# Patient Record
Sex: MALE | Race: WHITE | NOT HISPANIC OR LATINO | Employment: FULL TIME | URBAN - METROPOLITAN AREA
[De-identification: names, ages, dates, MRNs, and addresses within clinical notes are randomized per-mention and may not be internally consistent; named-entity substitution may affect disease eponyms.]

---

## 2020-07-20 ENCOUNTER — HOSPITAL ENCOUNTER (OUTPATIENT)
Dept: GENERAL RADIOLOGY | Facility: CLINIC | Age: 60
End: 2020-07-20
Attending: INTERNAL MEDICINE
Payer: COMMERCIAL

## 2020-07-20 ENCOUNTER — OFFICE VISIT (OUTPATIENT)
Dept: FAMILY MEDICINE | Facility: CLINIC | Age: 60
End: 2020-07-20
Payer: COMMERCIAL

## 2020-07-20 VITALS
TEMPERATURE: 96.3 F | SYSTOLIC BLOOD PRESSURE: 116 MMHG | BODY MASS INDEX: 33.65 KG/M2 | OXYGEN SATURATION: 96 % | HEART RATE: 80 BPM | HEIGHT: 68 IN | WEIGHT: 222 LBS | DIASTOLIC BLOOD PRESSURE: 68 MMHG | RESPIRATION RATE: 18 BRPM

## 2020-07-20 DIAGNOSIS — M54.12 CERVICAL RADICULOPATHY: ICD-10-CM

## 2020-07-20 DIAGNOSIS — K42.9 UMBILICAL HERNIA WITHOUT OBSTRUCTION AND WITHOUT GANGRENE: ICD-10-CM

## 2020-07-20 DIAGNOSIS — Z12.5 SCREENING FOR PROSTATE CANCER: ICD-10-CM

## 2020-07-20 DIAGNOSIS — E78.5 HYPERLIPIDEMIA LDL GOAL <130: ICD-10-CM

## 2020-07-20 DIAGNOSIS — I49.9 CARDIAC ARRHYTHMIA, UNSPECIFIED CARDIAC ARRHYTHMIA TYPE: Primary | ICD-10-CM

## 2020-07-20 LAB
ALBUMIN SERPL-MCNC: 4.1 G/DL (ref 3.4–5)
ALP SERPL-CCNC: 69 U/L (ref 40–150)
ALT SERPL W P-5'-P-CCNC: 45 U/L (ref 0–70)
ANION GAP SERPL CALCULATED.3IONS-SCNC: 3 MMOL/L (ref 3–14)
AST SERPL W P-5'-P-CCNC: 25 U/L (ref 0–45)
BASOPHILS # BLD AUTO: 0.1 10E9/L (ref 0–0.2)
BASOPHILS NFR BLD AUTO: 1 %
BILIRUB SERPL-MCNC: 0.4 MG/DL (ref 0.2–1.3)
BUN SERPL-MCNC: 14 MG/DL (ref 7–30)
CALCIUM SERPL-MCNC: 8.8 MG/DL (ref 8.5–10.1)
CHLORIDE SERPL-SCNC: 109 MMOL/L (ref 94–109)
CHOLEST SERPL-MCNC: 168 MG/DL
CO2 SERPL-SCNC: 27 MMOL/L (ref 20–32)
CREAT SERPL-MCNC: 0.82 MG/DL (ref 0.66–1.25)
DIFFERENTIAL METHOD BLD: ABNORMAL
EOSINOPHIL NFR BLD AUTO: 2.1 %
ERYTHROCYTE [DISTWIDTH] IN BLOOD BY AUTOMATED COUNT: 14 % (ref 10–15)
GFR SERPL CREATININE-BSD FRML MDRD: >90 ML/MIN/{1.73_M2}
GLUCOSE SERPL-MCNC: 112 MG/DL (ref 70–99)
HCT VFR BLD AUTO: 46.9 % (ref 40–53)
HDLC SERPL-MCNC: 31 MG/DL
HGB BLD-MCNC: 15.2 G/DL (ref 13.3–17.7)
IMM GRANULOCYTES # BLD: 0.2 10E9/L (ref 0–0.4)
IMM GRANULOCYTES NFR BLD: 2.2 %
LDLC SERPL CALC-MCNC: 73 MG/DL
LYMPHOCYTES # BLD AUTO: 1.9 10E9/L (ref 0.8–5.3)
LYMPHOCYTES NFR BLD AUTO: 22.1 %
MCH RBC QN AUTO: 27.7 PG (ref 26.5–33)
MCHC RBC AUTO-ENTMCNC: 32.4 G/DL (ref 31.5–36.5)
MCV RBC AUTO: 85 FL (ref 78–100)
MONOCYTES # BLD AUTO: 0.5 10E9/L (ref 0–1.3)
MONOCYTES NFR BLD AUTO: 5.8 %
NEUTROPHILS # BLD AUTO: 5.8 10E9/L (ref 1.6–8.3)
NEUTROPHILS NFR BLD AUTO: 66.8 %
NONHDLC SERPL-MCNC: 137 MG/DL
NRBC # BLD AUTO: 0 10*3/UL
NRBC BLD AUTO-RTO: 0 /100
PLATELET # BLD AUTO: 779 10E9/L (ref 150–450)
POTASSIUM SERPL-SCNC: 4.1 MMOL/L (ref 3.4–5.3)
PROT SERPL-MCNC: 7.2 G/DL (ref 6.8–8.8)
PSA SERPL-ACNC: 0.75 UG/L (ref 0–4)
RBC # BLD AUTO: 5.49 10E12/L (ref 4.4–5.9)
SODIUM SERPL-SCNC: 139 MMOL/L (ref 133–144)
TRIGL SERPL-MCNC: 321 MG/DL
WBC # BLD AUTO: 8.7 10E9/L (ref 4–11)

## 2020-07-20 PROCEDURE — 36415 COLL VENOUS BLD VENIPUNCTURE: CPT | Performed by: INTERNAL MEDICINE

## 2020-07-20 PROCEDURE — 93000 ELECTROCARDIOGRAM COMPLETE: CPT | Performed by: INTERNAL MEDICINE

## 2020-07-20 PROCEDURE — G0103 PSA SCREENING: HCPCS | Performed by: INTERNAL MEDICINE

## 2020-07-20 PROCEDURE — 85025 COMPLETE CBC W/AUTO DIFF WBC: CPT | Performed by: INTERNAL MEDICINE

## 2020-07-20 PROCEDURE — 99204 OFFICE O/P NEW MOD 45 MIN: CPT | Performed by: INTERNAL MEDICINE

## 2020-07-20 PROCEDURE — 80053 COMPREHEN METABOLIC PANEL: CPT | Performed by: INTERNAL MEDICINE

## 2020-07-20 PROCEDURE — 72040 X-RAY EXAM NECK SPINE 2-3 VW: CPT | Mod: TC

## 2020-07-20 PROCEDURE — 80061 LIPID PANEL: CPT | Performed by: INTERNAL MEDICINE

## 2020-07-20 ASSESSMENT — PAIN SCALES - GENERAL: PAINLEVEL: NO PAIN (0)

## 2020-07-20 ASSESSMENT — MIFFLIN-ST. JEOR: SCORE: 1783.55

## 2020-07-20 NOTE — LETTER
July 22, 2020      Radames Figueroa  [address] [place] [id number]        Dear Radames, your recent test results are attached.   Severe degenerative disc disease is noted in the neck.   You will be contacted with any outstanding results as they are available.   Feel free to contact me via the office or My Chart if you have any questions regarding the above    Resulted Orders   XR Cervical Spine 2/3 Views    Narrative    CERVICAL SPINE TWO TO THREE VIEWS   7/20/2020 10:05 AM     HISTORY: Cervical radiculopathy.    COMPARISON: None.      Impression    IMPRESSION: Moderate-to-severe degenerative disc disease is present at  C5-6. Alignment is significant for slight reversal of the normal  cervical lordosis and trace grade 1 anterolisthesis of C4 on C5.  Multilevel mild facet arthropathy is present. Small benign-appearing  ovoid calcification noted along the right cervical spine.    JOHN RODRIGUEZ MD       If you have any questions or concerns, please call the clinic at the number listed above.       Sincerely,        Delgado Wright DO

## 2020-07-20 NOTE — PROGRESS NOTES
Subjective     Radames Figueroa is a 60 year old male who presents to clinic today for the following health issues:    HPI       Chief Complaint   Patient presents with     Hernia     about 2 years, mid abdomen      Musculoskeletal Problem     left hand numbness into thing, has had neck pain an swelling as well                       Chief Complaint         The patient is a 60-year-old gentleman who presents today with 2 primary concerns.  He is concerned that he has a hernia in his mid abdomen.  He describes an umbilical hernia and notes that it is not painful or associated with any gastrointestinal symptoms but he is just curious as to whether or not it is a problem.  His other concern is that his left hand goes numb frequently.  He notes that it starts in the thumb and index finger but does radiate to all fingers eventually.  It goes up his arm past the elbow and into his shoulder.  He can make this worse with neck motion.  Phalen's and Tinel's tests did not elicit symptoms.  He notes no recent neck or trauma.  Is take some over-the-counter anti-inflammatories for this and they have brought about no significant improvement.  He takes no medication on a daily basis.                       PAST, FAMILY,SOCIAL HISTORY:     Medical  History:   has a past medical history of History of open heart surgery (age 9).     Surgical History:   has a past surgical history that includes vasectomy (11/25/92) and ANESTH,OPEN HEART SURGERY+PUMP (age 9).     Social History:   reports that he has never smoked. He has never used smokeless tobacco. He reports current alcohol use. He reports that he does not use drugs.     Family History:  family history includes C.A.D. (age of onset: 70) in his brother; C.A.D. (age of onset: 72) in his brother; C.A.D. (age of onset: 73) in his father; Cancer in his mother.            MEDICATIONS  No current outpatient medications on file.  "        --------------------------------------------------------------------------------------------------------------------                              Review of Systems       LUNGS: Pt denies: cough, excess sputum, hemoptysis, or shortness of breath.   HEART: Pt denies: chest pain, arrhythmia, syncope, tachy or bradyarrhythmia.   GI: Pt denies: nausea, vomiting, diarrhea, constipation, melena, or hematochezia.   NEURO: Pt denies: seizures, strokes, diplopia, weakness, paraesthesias, or paralysis.  Does have the discomfort in the left arm as described.   SKIN: Pt denies: itching, rashes, discoloration, or specific lesions of concern. Denies recent hair loss.   PSYCH: The patient denies significant depression, anxiety, mood imbalance. Specifically denies any suicidal ideation.      Examination       /68 (BP Location: Right arm, Patient Position: Sitting, Cuff Size: Adult Regular)   Pulse 80   Temp 96.3  F (35.7  C) (Temporal)   Resp 18   Ht 1.715 m (5' 7.5\")   Wt 100.7 kg (222 lb)   SpO2 96%   BMI 34.26 kg/m       Constitutional: The patient appears to be in no acute distress. The patient appears to be adequately hydrated. No acute respiratory or hemodynamic distress is noted at this time.   LUNGS: clear bilaterally, airflow is brisk, no intercostal retraction or stridor is noted. No coughing is noted during visit.   HEART: Somewhat irregular without rubs, clicks, gallops, or murmurs. PMI is nondisplaced. Upstrokes are brisk. S1,S2 are heard.   GI: Abdomen is soft, without rebound, guarding or tenderness. Bowel sounds are appropriate. No renal bruits are heard.  Evidence of an easily reducible umbilical hernia present.   NEURO: Pt is alert and appropriate. No neurologic lateralization is noted. Cranial nerves 2-12 are intact. Peripheral sensory and motor function are grossly normal.    MS: Minimal crepitance is noted in the extremities. No deformity is present. Muscle strength is appropriate and equal " bilaterally. No acute joint erythema or swelling is present.  Reproducible hand and shoulder pain is noted with cervical range of motion.       Decision Making       1. Umbilical hernia without obstruction and without gangrene  We will hold off on surgical consultation until his surgical concern is resolved.  The strangulation/incarceration etc.    2. Cervical radiculopathy  We will set up x-ray and MRI cervical spine to evaluate the potential for spinal/foraminal stenosis.  - XR Cervical Spine 2/3 Views; Future  - MR Cervical Spine w/o Contrast; Future    3. Cardiac arrhythmia, unspecified cardiac arrhythmia type  Patient did have some cardiac arrhythmia.  EKG demonstrates a sinus rhythm with right bundle branch block this time.  Will follow for recurrence or symptoms.  - Comprehensive metabolic panel (BMP + Alb, Alk Phos, ALT, AST, Total. Bili, TP)  - CBC with platelets and differential  - EKG 12-lead complete w/read - Clinics    4. Hyperlipidemia LDL goal <130  Check lipid panel and adjust medications accordingly  - Lipid panel reflex to direct LDL Fasting    5. Screening for prostate cancer  Screening performed  - PSA, screen                               FOLLOW UP   I have asked the patient to make an appointment for followup with me following the radiographic work-up            I have carefully explained the diagnosis and treatment options to the patient.  The patient has displayed an understanding of the above, and all subsequent questions were answered.      DO SHERRIE Mccabe    Portions of this note were produced using [employer]  Although every attempt at real-time proof reading has been made, occasional grammar/syntax errors may have been missed.

## 2020-07-20 NOTE — LETTER
July 22, 2020      Radames Figueroa  [address] [place] [phone]        Dear ,    We are writing to inform you of your test results.    The PSA suggest a low risk for prostate cancer.   The chemistry panel is normal with a minimally elevated blood sugar 112 and normal kidney and liver tests.   Cholesterol is well controlled with an LDL of 73.   The blood cell count shows a slight increase in platelets at 779, we should repeat the blood cell count and then a month or so as we do not have previous values for comparison.   You will be contacted with any outstanding results as they are available.   Feel free to contact me via the office or My Chart if you have any questions regarding the above.     Resulted Orders   Comprehensive metabolic panel (BMP + Alb, Alk Phos, ALT, AST, Total. Bili, TP)   Result Value Ref Range    Sodium 139 133 - 144 mmol/L    Potassium 4.1 3.4 - 5.3 mmol/L    Chloride 109 94 - 109 mmol/L    Carbon Dioxide 27 20 - 32 mmol/L    Anion Gap 3 3 - 14 mmol/L    Glucose 112 (H) 70 - 99 mg/dL      Comment:      Non Fasting    Urea Nitrogen 14 7 - 30 mg/dL    Creatinine 0.82 0.66 - 1.25 mg/dL    GFR Estimate >90 >60 mL/min/[1.73_m2]      Comment:      Non  GFR Calc  Starting 12/18/2018, serum creatinine based estimated GFR (eGFR) will be   calculated using the Chronic Kidney Disease Epidemiology Collaboration   (CKD-EPI) equation.      GFR Estimate If Black >90 >60 mL/min/[1.73_m2]      Comment:       GFR Calc  Starting 12/18/2018, serum creatinine based estimated GFR (eGFR) will be   calculated using the Chronic Kidney Disease Epidemiology Collaboration   (CKD-EPI) equation.      Calcium 8.8 8.5 - 10.1 mg/dL    Bilirubin Total 0.4 0.2 - 1.3 mg/dL    Albumin 4.1 3.4 - 5.0 g/dL    Protein Total 7.2 6.8 - 8.8 g/dL    Alkaline Phosphatase 69 40 - 150 U/L    ALT 45 0 - 70 U/L    AST 25 0 - 45 U/L   CBC with platelets and differential   Result Value Ref  Range    WBC 8.7 4.0 - 11.0 10e9/L    RBC Count 5.49 4.4 - 5.9 10e12/L    Hemoglobin 15.2 13.3 - 17.7 g/dL    Hematocrit 46.9 40.0 - 53.0 %    MCV 85 78 - 100 fl    MCH 27.7 26.5 - 33.0 pg    MCHC 32.4 31.5 - 36.5 g/dL    RDW 14.0 10.0 - 15.0 %    Platelet Count 779 (H) 150 - 450 10e9/L    Diff Method Automated Method     % Neutrophils 66.8 %    % Lymphocytes 22.1 %    % Monocytes 5.8 %    % Eosinophils 2.1 %    % Basophils 1.0 %    % Immature Granulocytes 2.2 %    Nucleated RBCs 0 0 /100    Absolute Neutrophil 5.8 1.6 - 8.3 10e9/L    Absolute Lymphocytes 1.9 0.8 - 5.3 10e9/L    Absolute Monocytes 0.5 0.0 - 1.3 10e9/L    Absolute Basophils 0.1 0.0 - 0.2 10e9/L    Abs Immature Granulocytes 0.2 0 - 0.4 10e9/L    Absolute Nucleated RBC 0.0    Lipid panel reflex to direct LDL Fasting   Result Value Ref Range    Cholesterol 168 <200 mg/dL    Triglycerides 321 (H) <150 mg/dL      Comment:      Borderline high:  150-199 mg/dl  High:             200-499 mg/dl  Very high:       >499 mg/dl  Non Fasting      HDL Cholesterol 31 (L) >39 mg/dL    LDL Cholesterol Calculated 73 <100 mg/dL      Comment:      Desirable:       <100 mg/dl    Non HDL Cholesterol 137 (H) <130 mg/dL      Comment:      Above Desirable:  130-159 mg/dl  Borderline high:  160-189 mg/dl  High:             190-219 mg/dl  Very high:       >219 mg/dl     PSA, screen   Result Value Ref Range    PSA 0.75 0 - 4 ug/L      Comment:      Assay Method:  Chemiluminescence using Siemens Vista analyzer       If you have any questions or concerns, please call the clinic at the number listed above.       Sincerely,        Delgado Wright DO

## 2020-07-22 NOTE — RESULT ENCOUNTER NOTE
Dear Radames, your recent test results are attached.  The PSA suggest a low risk for prostate cancer.  The chemistry panel is normal with a minimally elevated blood sugar 112 and normal kidney and liver tests.  Cholesterol is well controlled with an LDL of 73.  The blood cell count shows a slight increase in platelets at 779, we should repeat the blood cell count and then a month or so as we do not have previous values for comparison.  You will be contacted with any outstanding results as they are available.  Feel free to contact me via the office or My Chart if you have any questions regarding the above.

## 2020-07-22 NOTE — RESULT ENCOUNTER NOTE
Dear Radames, your recent test results are attached.  Severe degenerative disc disease is noted in the neck.  You will be contacted with any outstanding results as they are available.  Feel free to contact me via the office or My Chart if you have any questions regarding the above.

## 2020-07-24 ENCOUNTER — HOSPITAL ENCOUNTER (OUTPATIENT)
Dept: MRI IMAGING | Facility: CLINIC | Age: 60
End: 2020-07-24
Attending: INTERNAL MEDICINE
Payer: COMMERCIAL

## 2020-07-24 ENCOUNTER — HOSPITAL ENCOUNTER (OUTPATIENT)
Dept: GENERAL RADIOLOGY | Facility: CLINIC | Age: 60
End: 2020-07-24
Attending: INTERNAL MEDICINE
Payer: COMMERCIAL

## 2020-07-24 DIAGNOSIS — Z09 FOLLOW UP: ICD-10-CM

## 2020-07-24 DIAGNOSIS — M54.12 CERVICAL RADICULOPATHY: ICD-10-CM

## 2020-07-24 PROCEDURE — 71046 X-RAY EXAM CHEST 2 VIEWS: CPT | Mod: TC

## 2020-07-24 PROCEDURE — 72141 MRI NECK SPINE W/O DYE: CPT

## 2020-07-24 NOTE — LETTER
July 28, 2020      Radames Figueroa  [address] [place] [phone]        Dear ,    We are writing to inform you of your test results.    Chest x-ray is normal.   You will be contacted with any outstanding results as they are available.   Feel free to contact me via the office or My Chart if you have any questions regarding the above.     Resulted Orders   XR Chest 2 Views    Narrative    CHEST TWO VIEWS 7/24/2020 6:17 PM     HISTORY: Possible foreign body in the chest prior MRI    COMPARISON: None.       Impression    IMPRESSION: There are no acute infiltrates. The cardiac silhouette is  not enlarged. Pulmonary vasculature is unremarkable.    CHRISSIE ALEXANDRE MD       If you have any questions or concerns, please call the clinic at the number listed above.       Sincerely,        Delgado Wright DO

## 2020-07-28 ENCOUNTER — TELEPHONE (OUTPATIENT)
Dept: FAMILY MEDICINE | Facility: OTHER | Age: 60
End: 2020-07-28

## 2020-07-28 NOTE — TELEPHONE ENCOUNTER
----- Message from Delgado Wright DO sent at 7/28/2020  8:51 AM CDT -----  Please call      Dear Radames, your recent test results are attached.  MRI of the cervical spine demonstrates multilevel degenerative disc disease and arthritis.  There is mild to moderate spinal stenosis noted at 2 locations as well as some foraminal stenosis.      I would recommend neurosurgical evaluation.  Please let me know if you would like me to set this up.      You will be contacted with any outstanding results as they are available.  Feel free to contact me via the office or My Chart if you have any questions regarding the above.

## 2020-07-28 NOTE — RESULT ENCOUNTER NOTE
Please call      Dear Radames, your recent test results are attached.  MRI of the cervical spine demonstrates multilevel degenerative disc disease and arthritis.  There is mild to moderate spinal stenosis noted at 2 locations as well as some foraminal stenosis.      I would recommend neurosurgical evaluation.  Please let me know if you would like me to set this up.      You will be contacted with any outstanding results as they are available.  Feel free to contact me via the office or My Chart if you have any questions regarding the above.

## 2020-07-28 NOTE — RESULT ENCOUNTER NOTE
Dear Radames, your recent test results are attached.  Chest x-ray is normal.  You will be contacted with any outstanding results as they are available.  Feel free to contact me via the office or My Chart if you have any questions regarding the above.

## 2020-07-28 NOTE — TELEPHONE ENCOUNTER
Called and LM for patient to call back. Please relay results below to patient .     Lilibeth Mitchell MA

## 2020-07-30 NOTE — TELEPHONE ENCOUNTER
Edil returns call and message is relayed.  Edil states he would like to take some time to let this information sink in and think about it before he makes a decision.

## 2022-01-28 ENCOUNTER — VIRTUAL VISIT (OUTPATIENT)
Dept: FAMILY MEDICINE | Facility: CLINIC | Age: 62
End: 2022-01-28
Payer: COMMERCIAL

## 2022-01-28 DIAGNOSIS — L03.115 CELLULITIS OF RIGHT LOWER EXTREMITY: Primary | ICD-10-CM

## 2022-01-28 PROCEDURE — 99213 OFFICE O/P EST LOW 20 MIN: CPT | Mod: GT | Performed by: NURSE PRACTITIONER

## 2022-01-28 RX ORDER — DOXYCYCLINE 100 MG/1
100 CAPSULE ORAL 2 TIMES DAILY
Qty: 20 CAPSULE | Refills: 0 | Status: SHIPPED | OUTPATIENT
Start: 2022-01-28 | End: 2022-02-03

## 2022-01-28 NOTE — PROGRESS NOTES
Edil is a 62 year old who is being evaluated via a billable video visit.      How would you like to obtain your AVS? MyChart  If the video visit is dropped, the invitation should be resent by: Text to cell phone: [phone] TEXT PT'S PHONE FOR VISIT  Will anyone else be joining your video visit? No      Video Start Time: 4:27 PM    Assessment & Plan     Cellulitis of right lower extremity  Lesion on video concerning for cellulitis and possible MRSA based on brief visualization. Recommended starting doxycycline to cover infection. Discussed with patient that I would like him seen in person for further evaluation.   - doxycycline hyclate (VIBRAMYCIN) 100 MG capsule; Take 1 capsule (100 mg) by mouth 2 times daily      Return in about 4 days (around 2/1/2022) for re-evaluation of the lesion. .    Jaz Kirby, ROMEO CNP  [facility]    Subjective   Edil is a 62 year old who presents for the following health issues     HPI     Rashpt has some red spots with black center on rt knee that are getting red around them 1-2 weeks one got better and went away but more are showing up  Onset/Duration:   Description  Location: rt knee  Character: raised, painful, red  Itching: no  Intensity:  moderate  Progression of Symptoms:  worsening and constant  Accompanying signs and symptoms:   Fever: no  Body aches or joint pain: no  Sore throat symptoms: no  Recent cold symptoms: no  History:           Previous episodes of similar rash: None  New exposures:  None  Recent travel: no  Exposure to similar rash: no  Precipitating or alleviating factors: none  Therapies tried and outcome: antibiotic ointment    Large lesion on right leg that is above the knee. It is raised and painful. It is about a quarter size. He has been putting thieves on the sore and neosporin.   He has two other lesions that are on his       Review of Systems   Constitutional, HEENT, cardiovascular, pulmonary, gi and gu systems are  negative, except as otherwise noted.      Objective           Vitals:  No vitals were obtained today due to virtual visit.    Physical Exam   GENERAL: Healthy, alert and no distress  EYES: Eyes grossly normal to inspection.  No discharge or erythema, or obvious scleral/conjunctival abnormalities.  RESP: No audible wheeze, cough, or visible cyanosis.  No visible retractions or increased work of breathing.    SKIN: Right distal thigh lesion with eschar/ yellow crusting on top. slight erythremia around the lesion.  Other pinpoint lesions are present on the legs.    NEURO: Cranial nerves grossly intact.  Mentation and speech appropriate for age.  PSYCH: Mentation appears normal, affect normal/bright, judgement and insight intact, normal speech and appearance well-groomed.                Video-Visit Details    Type of service:  Video Visit    Video End Time:4:35 PM    Originating Location (pt. Location): Home    Distant Location (provider location):  [facility]     Platform used for Video Visit: [employer] Dual[employer]

## 2022-02-03 ENCOUNTER — HOSPITAL ENCOUNTER (EMERGENCY)
Facility: CLINIC | Age: 62
Discharge: HOME | End: 2022-02-03
Attending: NURSE PRACTITIONER | Admitting: NURSE PRACTITIONER
Payer: COMMERCIAL

## 2022-02-03 VITALS
HEART RATE: 80 BPM | DIASTOLIC BLOOD PRESSURE: 83 MMHG | OXYGEN SATURATION: 98 % | SYSTOLIC BLOOD PRESSURE: 171 MMHG | BODY MASS INDEX: 29.77 KG/M2 | TEMPERATURE: 97.2 F | HEIGHT: 69 IN | WEIGHT: 201 LBS

## 2022-02-03 DIAGNOSIS — T14.8XXA WOUND INFECTION: ICD-10-CM

## 2022-02-03 DIAGNOSIS — L03.115 CELLULITIS OF RIGHT LOWER EXTREMITY: ICD-10-CM

## 2022-02-03 DIAGNOSIS — L08.9 WOUND INFECTION: ICD-10-CM

## 2022-02-03 PROCEDURE — G0463 HOSPITAL OUTPT CLINIC VISIT: HCPCS | Mod: 25 | Performed by: NURSE PRACTITIONER

## 2022-02-03 PROCEDURE — 10060 I&D ABSCESS SIMPLE/SINGLE: CPT | Performed by: NURSE PRACTITIONER

## 2022-02-03 PROCEDURE — 87077 CULTURE AEROBIC IDENTIFY: CPT | Performed by: NURSE PRACTITIONER

## 2022-02-03 PROCEDURE — 99213 OFFICE O/P EST LOW 20 MIN: CPT | Mod: 25 | Performed by: NURSE PRACTITIONER

## 2022-02-03 PROCEDURE — 87205 SMEAR GRAM STAIN: CPT | Performed by: NURSE PRACTITIONER

## 2022-02-03 RX ORDER — DOXYCYCLINE 100 MG/1
100 CAPSULE ORAL 2 TIMES DAILY
Qty: 20 CAPSULE | Refills: 0 | Status: SHIPPED | OUTPATIENT
Start: 2022-02-03

## 2022-02-03 RX ORDER — CEPHALEXIN 500 MG/1
500 CAPSULE ORAL 4 TIMES DAILY
Qty: 40 CAPSULE | Refills: 0 | Status: SHIPPED | OUTPATIENT
Start: 2022-02-03 | End: 2022-02-06

## 2022-02-03 ASSESSMENT — MIFFLIN-ST. JEOR: SCORE: 1702.11

## 2022-02-04 NOTE — DISCHARGE INSTRUCTIONS
Go home tonight and shower.  Remove the dressing prior to showering.  Pat dry and apply a small amount of bacitracin to the area.  Leave the wound open to air.  Continue the doxycycline twice daily.  Also start Keflex and take this four times daily.  When the antibiotics are finished, if the infection is still present you must be seen.  If at any point one time you develop fever of 101, chills, sweats or the redness or swelling that begins to worsen again please return for reevaluation.  Use the chlorhexidine soap and shower with this for the next 3 days.  The wound culture should result within 48 hours and be available via [employer].

## 2022-02-04 NOTE — ED PROVIDER NOTES
History   No chief complaint on file.    HPI  Radames Figueroa is a 62 year old male who presents to urgent care for follow-up wound check.  Patient seen via virtual visit on January 29 and started on doxycycline for suspected infected wound versus cellulitis.  Patient reports at that time he had severe redness of his right lower leg including swelling.  Patient denies any trauma at that time.  Patient states he had been out of town for work and may have had insect bites but he is uncertain.  Patient states since initiating the doxycycline the swelling has improved by at least 90% and the redness has improved by at least 90%.  Patient states the scabbed area around his knee still has localized swelling and redness.  Patient states he continues to have other small red dots that pop up elsewhere but are not nearly as large and they do not lose pustular drainage like previously.  Patient denies any fever, aches, chills, sweats, history of MRSA, history of similar illness.  Patient states no one else around him has similar symptoms.  Patient denies chest pain, cough, shortness of breath, difficulty breathing, nausea, vomiting, abdominal pain, mental confusion, dizziness or lightheadedness.      Allergies:  No Known Allergies    Problem List:    Patient Active Problem List    Diagnosis Date Noted     CARDIOVASCULAR SCREENING; LDL GOAL LESS THAN 130 10/20/2014     Priority: Medium     Umbilical hernia 10/20/2014     Priority: Medium     Seborrheic keratosis 10/20/2014     Priority: Medium        Past Medical History:    Past Medical History:   Diagnosis Date     History of open heart surgery age 9       Past Surgical History:    Past Surgical History:   Procedure Laterality Date     VASECTOMY  11/25/92     ZZC ANESTH,OPEN HEART SURGERY+PUMP  age 9    valve replacement       Family History:    Family History   Problem Relation Age of Onset     C.A.D. Father 73        [place]     Cancer Mother         multiple myeloma      "C.A.D. Brother 72        MI     C.A.D. Brother 70        MI     Cancer - colorectal No family hx of      Prostate Cancer No family hx of        Social History:  Marital Status:   [2]  Social History     Tobacco Use     Smoking status: Never Smoker     Smokeless tobacco: Never Used   Vaping Use     Vaping Use: Never used   Substance Use Topics     Alcohol use: Yes     Comment: 1-2 per week.     Drug use: No        Medications:    cephALEXin (KEFLEX) 500 MG capsule  doxycycline hyclate (VIBRAMYCIN) 100 MG capsule          Review of Systems  As mentioned above in the history present illness. All other systems were reviewed and are negative.    Physical Exam   BP: (!) 171/83  Pulse: 80  Temp: 97.2  F (36.2  C)  Height: 175.3 cm (5' 9\")  Weight: 91.2 kg (201 lb)  SpO2: 98 %      Physical Exam  Vitals and nursing note reviewed.   Constitutional:       General: He is not in acute distress.     Appearance: He is well-developed. He is obese. He is not ill-appearing, toxic-appearing or diaphoretic.   HENT:      Head: Normocephalic and atraumatic.      Right Ear: External ear normal.      Left Ear: External ear normal.      Nose: Nose normal.   Eyes:      General:         Right eye: No discharge.         Left eye: No discharge.      Conjunctiva/sclera: Conjunctivae normal.   Cardiovascular:      Rate and Rhythm: Normal rate and regular rhythm.      Heart sounds: Normal heart sounds. No murmur heard.  No friction rub. No gallop.    Pulmonary:      Effort: Pulmonary effort is normal.      Breath sounds: Normal breath sounds. No stridor. No wheezing.   Skin:     General: Skin is warm.      Findings: Erythema (4 cm by 6 cm rectangular erythematous macoule with central scab that is weeping purulent drainage) present. No rash.      Comments: The wound was cleansed with Hibiclens and water and the scab was deroofed and cultured.  The wound was dressed with bacitracin and a 4 x 4.     Neurological:      Mental Status: He is " alert and oriented to person, place, and time.                 ED Course                 Procedures    No results found for this or any previous visit (from the past 24 hour(s)).    Medications - No data to display    Assessments & Plan (with Medical Decision Making)     I have reviewed the nursing notes.    I have reviewed the findings, diagnosis, plan and need for follow up with the patient.  Radames Figueroa is a 62 year old male who presents to urgent care for follow-up wound check after being seen via virtual visit and initiated on doxycycline 5 days ago.  Patient reports his symptoms are markedly improved and denies any fever, aches, chills, sweats and low suspicion for sepsis.  Given the purulent drainage noted around the central lesion this area was cultured, cleansed, deroofed and redressed.  For supportive management did place patient on Keflex as well.  Patient is on my chart and can follow his culture results.  Refill doxycycline.  Discussed worrisome reasons to return.  Discussed skin care.  Patient verbalized understanding and discharged in stable condition.    Discharge Medication List as of 2/3/2022  6:34 PM      START taking these medications    Details   cephALEXin (KEFLEX) 500 MG capsule Take 1 capsule (500 mg) by mouth 4 times daily for 10 days, Disp-40 capsule, R-0, E-Prescribe             Final diagnoses:   Cellulitis of right lower extremity   Wound infection       2/3/2022   [facility] EMERGENCY DEPT     Darian, ROMEO Holland CNP  02/03/22 [id number]

## 2022-02-04 NOTE — RESULT ENCOUNTER NOTE
[facility] Emergency Dept discharge antibiotic prescribed: [1] Cephalexin (Keflex) 500 mg capsule, 1 capsule (500 mg) by mouth 4 times daily for 10 days AND [2] Doxycycline (Per ED Provider note, Patient was advised to refill Doxycycline (Rx extended 10 days)   Incision and Drainage performed in [facility] Emergency Dept [Yes or No]: No  Recommendations in treatment per [facility] ED Lab Result culture protocol

## 2022-02-06 ENCOUNTER — TELEPHONE (OUTPATIENT)
Dept: EMERGENCY MEDICINE | Facility: CLINIC | Age: 62
End: 2022-02-06
Payer: COMMERCIAL

## 2022-02-06 DIAGNOSIS — A49.02 MRSA INFECTION: ICD-10-CM

## 2022-02-06 LAB
BACTERIA WND CULT: ABNORMAL
GRAM STAIN RESULT: ABNORMAL
GRAM STAIN RESULT: ABNORMAL

## 2022-02-06 RX ORDER — CLINDAMYCIN HCL 150 MG
450 CAPSULE ORAL 3 TIMES DAILY
Qty: 45 CAPSULE | Refills: 0 | Status: SHIPPED | OUTPATIENT
Start: 2022-02-06 | End: 2022-02-11

## 2022-02-06 NOTE — TELEPHONE ENCOUNTER
RN Recommendations/Instructions per [facility] ED provider  1:20PM: Patient notified of ED provider's treatment recommendations as noted below.  Rx for Clindamycin (Cleocin) 150 mg PO capsule, Take 3 capsules (450 mg) by mouth 3 times daily for 5 days sent to [Pharmacy - [facility] in [facility] in [place]].  RN reviewed information about MRSA from protocol.  Advised to stop Keflex. Continue taking Doxycycline. Add Clindamycin.   Information on MRSA sent to patient via [email or web address].   Advised to follow up with a PCP when his antibiotic is done for a recheck.  The patient is comfortable with the information given and has no further questions.      Please Contact your PCP clinic or return to the Emergency department if your:    Symptoms do not resolve after completing antibiotic.    Symptoms worsen or other concerning symptom's.    PCP follow-up Questions asked: YES       Carina Limon RN  [facility]  Emergency Dept Lab Result RN  Ph# [phone]

## 2022-02-06 NOTE — TELEPHONE ENCOUNTER
"[facility] ([place]) Emergency Department Lab result notification [Adult-Male]     [facility] ED lab result protocol used  Wound Culture     Reason for call  Notify of lab results, assess symptoms,  review ED providers recommendations/discharge instructions (if necessary) and advise per ED lab result f/u protocol     Lab Result (including Rx patient on, if applicable)  Final Wound Aerobic Bacterial Culture (specimen - Knee, Right; wound) report on 2/6/22  [facility] Emergency Dept discharge antibiotic prescribed: Cephalexin (Keflex) 500 mg capsule, 1 capsule (500 mg) by mouth 4 times daily for 10 days AND [2] Doxycycline (Per ED Provider note, Patient was advised to refill Doxycycline (Rx extended 10 days)   #1. Bacteria, 3+ Staphylococcus aureus MRSA,  is [RESISTANT] to antibiotic.  Incision and Drainage performed in the [facility] ED: NO  Recommendations in treatment per [facility] ED lab result culture protocol    RN Assessment (Patient s current Symptoms), include time called.  [Insert Left message here if message left]  12:44PM: Spoke with patient. He states that the antibiotics have been helping some areas of the sores/red spots. Feels like he is improving. The redness is less. The swelling has gone \"way down\". Has new areas of redness (spots) that have appeared on the same leg and one spot on the abdomen.     RN Recommendations/Instructions per [facility] ED lab result protocol  Patient notified of lab result and treatment recommendations.   RN reviewed information about MRSA from protocol patient education and Epic reference.   Advised to wash hands frequently.   Do not share any personal items such as towels, washcloths.  Wash sheets, bedding, towels, etc in hot water with laundry detergent with liquid bleach and use a hot dryer.   Wipe down commonly touched surfaces with a bleach solution.    Advised that I would consult with the ED provider and call him back.     [facility] " Department Provider Name & Recommendations (included time consulted)  1:06PM:  Consulted with [facility] ED provider Dr. Rinku Grace. Reviewed patient's history, current symptoms and culture results. Dr. Grace recommends stopping Keflex, continue Doxycycline. Start Clindamycin 450mg 3 times a day for 5 days.      PCP follow-up Questions asked: YES       Carina Limon RN  [facility]  Emergency Dept Lab Result RN  Ph# [phone]

## 2022-02-06 NOTE — PATIENT INSTRUCTIONS
"  Patient Education   What is Methicillin-resistant Staphylococcus aureus (MRSA)?  Your laboratory test was positive for MRSA.   What is MRSA?   MRSA is a type of staph bacteria that can stop antibiotic medicines from working.  Staphylococcus aureus (staph, for short) bacteria normally live on the skin or in the nose without causing problems. Sometimes, these bacteria learn how to break down the antibiotic, methicillin. This is known as MRSA (methicillin-resistant Staphylococcus aureus. MRSA can happen when people take antibiotics they do not need or when they take them incorrectly.  In many healthy people, MRSA can live in the body without making the person feel sick. When MRSA does make someone feel sick, the sickness could be in any part of the body.  How is it spread?    MRSA can be spread from person-to-person by:  ? Unclean hands  ? Sharing personal items  ? Touching contaminated surfaces or equipment    MRSA is not usually spread through the air by coughing or sneezing.  Even if you don't feel sick, you can pass MRSA to others, and they could become sick.  How is MRSA treated?    You may not need medicine for this. Your care team will decide.    If medicine is needed, we will give you oral or intravenous (IV) antibiotics.  ? When you get sick because of MRSA, the infection can be hard to treat. This is because your care team has fewer antibiotics to choose from.  How does the hospital prevent infections?  Since hospitals have a lot of very sick people in them, they take extra steps to stop MRSA from spreading to other patients. We will put a \"Contact Precautions\" sign on your door. This will remind staff of the extra steps to take when they go into your room:      Handwashing and alcohol-based hand : We will clean our hands before coming into your room, touching you or leaving your room.      Gown and gloves: We will wear a gown and gloves when we care for you.      Cleaning: We will clean " stethoscopes and other instruments before and after each use.  How do I prevent spreading MRSA at home?  To prevent spreading MRSA to others, you and your caregivers should:       Clean your hands often. Use soap and water or alcohol-based hand , especially at these times:  ? Before preparing food  ? Before and after touching wounds  ? After using the bathroom.      Clean your bathroom, light switches, doorknobs and countertops often.    Take antibiotics exactly as prescribed.  ? Don't skip doses. Don't stop taking them, even if you feel better.  ? Don't share your antibiotics.  ? Only take antibiotics if your care team says you need them.  Reminder:  Tell your caregivers (including dentists and home health staff) that you have MRSA. They can take extra steps to protect themselves and others.  For informational purposes only. Not to replace the advice of your health care provider. Copyright   2021 [facility]. All rights reserved. Clinically reviewed by [employer]. [employer] 171510 - 09/21.       Patient Education   What is Methicillin-resistant Staphylococcus aureus (MRSA)?  Your laboratory test was positive for MRSA.   What is MRSA?   MRSA is a type of staph bacteria that can stop antibiotic medicines from working.  Staphylococcus aureus (staph, for short) bacteria normally live on the skin or in the nose without causing problems. Sometimes, these bacteria learn how to break down the antibiotic, methicillin. This is known as MRSA (methicillin-resistant Staphylococcus aureus. MRSA can happen when people take antibiotics they do not need or when they take them incorrectly.  In many healthy people, MRSA can live in the body without making the person feel sick. When MRSA does make someone feel sick, the sickness could be in any part of the body.  How is it spread?    MRSA can be spread from person-to-person by:  ? Unclean hands  ? Sharing personal items  ? Touching  "contaminated surfaces or equipment    MRSA is not usually spread through the air by coughing or sneezing.  Even if you don't feel sick, you can pass MRSA to others, and they could become sick.  How is MRSA treated?    You may not need medicine for this. Your care team will decide.    If medicine is needed, we will give you oral or intravenous (IV) antibiotics.  ? When you get sick because of MRSA, the infection can be hard to treat. This is because your care team has fewer antibiotics to choose from.  How does the hospital prevent infections?  Since hospitals have a lot of very sick people in them, they take extra steps to stop MRSA from spreading to other patients. We will put a \"Contact Precautions\" sign on your door. This will remind staff of the extra steps to take when they go into your room:      Handwashing and alcohol-based hand : We will clean our hands before coming into your room, touching you or leaving your room.      Gown and gloves: We will wear a gown and gloves when we care for you.      Cleaning: We will clean stethoscopes and other instruments before and after each use.  How do I prevent spreading MRSA at home?  To prevent spreading MRSA to others, you and your caregivers should:       Clean your hands often. Use soap and water or alcohol-based hand , especially at these times:  ? Before preparing food  ? Before and after touching wounds  ? After using the bathroom.      Clean your bathroom, light switches, doorknobs and countertops often.    Take antibiotics exactly as prescribed.  ? Don't skip doses. Don't stop taking them, even if you feel better.  ? Don't share your antibiotics.  ? Only take antibiotics if your care team says you need them.  Reminder:  Tell your caregivers (including dentists and home health staff) that you have MRSA. They can take extra steps to protect themselves and others.  For informational purposes only. Not to replace the advice of your health care " provider. Copyright   2021 [facility]. All rights reserved. Clinically reviewed by [employer]fection [employer] Control. [employer] 177198 - 09/21.

## 2022-02-11 ENCOUNTER — HOSPITAL ENCOUNTER (EMERGENCY)
Facility: CLINIC | Age: 62
Discharge: HOME | End: 2022-02-11
Attending: NURSE PRACTITIONER | Admitting: NURSE PRACTITIONER
Payer: COMMERCIAL

## 2022-02-11 VITALS
DIASTOLIC BLOOD PRESSURE: 86 MMHG | SYSTOLIC BLOOD PRESSURE: 146 MMHG | OXYGEN SATURATION: 98 % | HEART RATE: 78 BPM | RESPIRATION RATE: 18 BRPM | TEMPERATURE: 97.6 F

## 2022-02-11 DIAGNOSIS — A49.02 MRSA INFECTION: ICD-10-CM

## 2022-02-11 PROCEDURE — 99214 OFFICE O/P EST MOD 30 MIN: CPT | Performed by: NURSE PRACTITIONER

## 2022-02-11 PROCEDURE — G0463 HOSPITAL OUTPT CLINIC VISIT: HCPCS | Performed by: NURSE PRACTITIONER

## 2022-02-11 RX ORDER — AMOXICILLIN 500 MG/1
CAPSULE ORAL
COMMUNITY
Start: 2021-11-24

## 2022-02-11 RX ORDER — CLINDAMYCIN HCL 300 MG
300 CAPSULE ORAL 4 TIMES DAILY
Qty: 40 CAPSULE | Refills: 0 | Status: SHIPPED | OUTPATIENT
Start: 2022-02-11 | End: 2022-02-21

## 2022-02-11 ASSESSMENT — ENCOUNTER SYMPTOMS: WOUND: 1

## 2022-02-12 ENCOUNTER — HEALTH MAINTENANCE LETTER (OUTPATIENT)
Age: 62
End: 2022-02-12

## 2022-02-12 NOTE — ED PROVIDER NOTES
History     Chief Complaint   Patient presents with     Wound Check     recheck on wound      HPI  Radames Figueroa is a 62 year old male who presents to the department for reevaluation of wound to the right knee. Patient evaluated in the department 8 days ago with wound culture revealing MRSA susceptible to Clindamycin, antibiotics adjusted accordingly. Patient is traveling in the next couple days and wanted wound reevaluated. He feels the erythema is significantly improved. Minimal pain present. Otherwise doing well.     Allergies:  No Known Allergies    Problem List:    Patient Active Problem List    Diagnosis Date Noted     CARDIOVASCULAR SCREENING; LDL GOAL LESS THAN 130 10/20/2014     Priority: Medium     Umbilical hernia 10/20/2014     Priority: Medium     Seborrheic keratosis 10/20/2014     Priority: Medium        Past Medical History:    Past Medical History:   Diagnosis Date     History of open heart surgery age 9       Past Surgical History:    Past Surgical History:   Procedure Laterality Date     VASECTOMY  11/25/92     ZZC ANESTH,OPEN HEART SURGERY+PUMP  age 9    valve replacement       Family History:    Family History   Problem Relation Age of Onset     C.A.D. Father 73        [place]     Cancer Mother         multiple myeloma     C.A.D. Brother 72        [place]     C.A.D. Brother 70        [place]     Cancer - colorectal No family hx of      Prostate Cancer No family hx of        Social History:  Marital Status:   [2]  Social History     Tobacco Use     Smoking status: Never Smoker     Smokeless tobacco: Never Used   Vaping Use     Vaping Use: Never used   Substance Use Topics     Alcohol use: Yes     Comment: 1-2 per week.     Drug use: No        Medications:    clindamycin (CLEOCIN) 300 MG capsule  amoxicillin (AMOXIL) 500 MG capsule  doxycycline hyclate (VIBRAMYCIN) 100 MG capsule          Review of Systems   Skin: Positive for wound.   All other systems reviewed and are negative.      Physical  Exam   BP: (!) 146/86  Pulse: 78  Temp: 97.6  F (36.4  C)  Resp: 18  SpO2: 98 %      Physical Exam  Constitutional:       General: He is not in acute distress.     Appearance: Normal appearance.   Cardiovascular:      Rate and Rhythm: Normal rate.   Pulmonary:      Effort: Pulmonary effort is normal.   Musculoskeletal:         General: Normal range of motion.   Skin:     General: Skin is warm.      Capillary Refill: Capillary refill takes less than 2 seconds.      Comments: See image below of current right lateral knee wound   Neurological:      General: No focal deficit present.      Mental Status: He is alert.             ED Course                 Procedures    No results found for this or any previous visit (from the past 24 hour(s)).    Medications - No data to display    Assessments & Plan (with Medical Decision Making)   Radames Figueroa is a 62 year old male who presents to the department for reevaluation of wound to the right knee. Patient is traveling in the next couple days and wanted wound reevaluated. He feels the erythema is significantly improved. Minimal pain present. Vitals normal. Exam as above. Will extend clindamycin coverage, educated on continued wound care. He is to follow up in outpatient clinic when he returns from his vacation next week. Advised to seek care prior if any new or worsening symptoms. Agreeable to plan and discharged in good condition.     I have reviewed the nursing notes.    I have reviewed the findings, diagnosis, plan and need for follow up with the patient.    Discharge Medication List as of 2/11/2022  7:00 PM          Final diagnoses:   MRSA infection       2/11/2022   [facility] EMERGENCY DEPT     Senia Hirsch, ROMEO CNP  02/11/22 1914

## 2022-03-11 ENCOUNTER — TELEPHONE (OUTPATIENT)
Dept: FAMILY MEDICINE | Facility: CLINIC | Age: 62
End: 2022-03-11
Payer: COMMERCIAL

## 2022-03-11 NOTE — TELEPHONE ENCOUNTER
Patient Quality Outreach    Patient is due for the following:   Colon Cancer Screening -  FIT, Colonoscopy and Cologuard    NEXT STEPS:   Pt is due for colon screening     Type of outreach:    Sent [employer] message.      Questions for provider review:    None     Mallory Fan MA

## 2022-10-09 ENCOUNTER — HEALTH MAINTENANCE LETTER (OUTPATIENT)
Age: 62
End: 2022-10-09

## 2023-03-25 ENCOUNTER — HEALTH MAINTENANCE LETTER (OUTPATIENT)
Age: 63
End: 2023-03-25

## 2023-07-17 ENCOUNTER — HOSPITAL ENCOUNTER (EMERGENCY)
Facility: CLINIC | Age: 63
Discharge: HOME | End: 2023-07-17
Attending: PHYSICIAN ASSISTANT | Admitting: PHYSICIAN ASSISTANT
Payer: COMMERCIAL

## 2023-07-17 VITALS
RESPIRATION RATE: 16 BRPM | SYSTOLIC BLOOD PRESSURE: 143 MMHG | DIASTOLIC BLOOD PRESSURE: 73 MMHG | HEART RATE: 79 BPM | OXYGEN SATURATION: 96 % | TEMPERATURE: 97.9 F

## 2023-07-17 DIAGNOSIS — H61.21 IMPACTED CERUMEN OF RIGHT EAR: ICD-10-CM

## 2023-07-17 PROCEDURE — 99212 OFFICE O/P EST SF 10 MIN: CPT | Mod: 25 | Performed by: PHYSICIAN ASSISTANT

## 2023-07-17 PROCEDURE — 69210 REMOVE IMPACTED EAR WAX UNI: CPT | Mod: RT | Performed by: PHYSICIAN ASSISTANT

## 2023-07-17 PROCEDURE — G0463 HOSPITAL OUTPT CLINIC VISIT: HCPCS | Mod: 25 | Performed by: PHYSICIAN ASSISTANT

## 2023-07-17 RX ORDER — ASPIRIN 81 MG/1
81 TABLET, CHEWABLE ORAL
COMMUNITY
Start: 2023-04-06

## 2023-07-17 RX ORDER — METOPROLOL TARTRATE 25 MG/1
12.5 TABLET, FILM COATED ORAL
COMMUNITY
Start: 2023-04-06

## 2023-07-18 NOTE — ED TRIAGE NOTES
Pt reports wax in the right ear and needing irrigation.   Has hearing aid appointment coming up.

## 2023-07-18 NOTE — ED PROVIDER NOTES
History     Chief Complaint   Patient presents with     Ear Fullness       HPI  Radames Figueroa is a 63 year old male who presents for ear cleaning.  He is scheduled to have hearing aids fitted tomorrow and was told that his right ear is full of wax and should get it cleaned prior to his appointment.  He had no pain in the ear but has had progressively worsening hearing loss.  No fevers, otherwise at baseline health.        Allergies:  No Known Allergies    Problem List:    Patient Active Problem List    Diagnosis Date Noted     CARDIOVASCULAR SCREENING; LDL GOAL LESS THAN 130 10/20/2014     Priority: Medium     Umbilical hernia 10/20/2014     Priority: Medium     Seborrheic keratosis 10/20/2014     Priority: Medium        Past Medical History:    Past Medical History:   Diagnosis Date     History of open heart surgery age 9       Past Surgical History:    Past Surgical History:   Procedure Laterality Date     VASECTOMY  11/25/92     ZZC ANESTH,OPEN HEART SURGERY+PUMP  age 9    valve replacement       Family History:    Family History   Problem Relation Age of Onset     C.A.D. Father 73        [place]     Cancer Mother         multiple myeloma     C.A.D. Brother 72        [place]     C.A.D. Brother 70        [place]     Cancer - colorectal No family hx of      Prostate Cancer No family hx of        Social History:  Marital Status:   [2]  Social History     Tobacco Use     Smoking status: Never     Smokeless tobacco: Never   Vaping Use     Vaping Use: Never used   Substance Use Topics     Alcohol use: Yes     Comment: 1-2 per week.     Drug use: No        Medications:    aspirin (ASA) 81 MG chewable tablet  metoprolol tartrate (LOPRESSOR) 25 MG tablet  amoxicillin (AMOXIL) 500 MG capsule  doxycycline hyclate (VIBRAMYCIN) 100 MG capsule          Review of Systems   All other systems reviewed and are negative.         Physical Exam   BP: (!) 143/73  Pulse: 79  Temp: 97.9  F (36.6  C)  Resp: 16  SpO2: 96 %      Physical  Exam  Vitals and nursing note reviewed.   Constitutional:       General: He is not in acute distress.     Appearance: Normal appearance. He is not ill-appearing, toxic-appearing or diaphoretic.   HENT:      Head: Normocephalic and atraumatic.      Right Ear: Tympanic membrane normal. There is impacted cerumen.      Left Ear: Tympanic membrane normal. There is no impacted cerumen.      Ears:      Comments: Right ear with dark brown/black impacted cerumen.  Once cleared, TM appeared normal.     Nose: Nose normal.      Mouth/Throat:      Mouth: Mucous membranes are moist.   Eyes:      Extraocular Movements: Extraocular movements intact.      Conjunctiva/sclera: Conjunctivae normal.   Pulmonary:      Effort: Pulmonary effort is normal. No respiratory distress.   Musculoskeletal:         General: No deformity.      Cervical back: Neck supple.   Skin:     General: Skin is warm and dry.   Neurological:      General: No focal deficit present.      Mental Status: He is alert and oriented to person, place, and time. Mental status is at baseline.   Psychiatric:         Mood and Affect: Mood normal.         Behavior: Behavior normal.           ED Course             Procedures         No results found for this or any previous visit (from the past 24 hour(s)).    Medications - No data to display       Assessments & Plan (with Medical Decision Making)  Radames Figueroa is a 63 year old male who presented to the ED for cerumen impaction of the right ear.  Requesting ear cleaning.  No other symptoms.  This was performed using copious irrigation along with gentle curette extraction.  Patient tolerated procedure well and TM appeared normal afterwards.  Reported hearing had improved.  He was encouraged to try to get his ears cleaned regularly and discussed ways to mitigate risk of buildup again.  Return precautions provided.     I have reviewed the nursing notes.    I have reviewed the findings, diagnosis, plan and need for follow  up with the patient.    New Prescriptions    No medications on file       Final diagnoses:   Impacted cerumen of right ear     Note: Chart documentation done in part with Dragon Voice Recognition software. Although reviewed after completion, some word and grammatical errors may remain.     7/17/2023   [facility] EMERGENCY DEPT     Medina Pickard PA-C  07/17/23 2021

## 2023-10-06 ENCOUNTER — HOSPITAL ENCOUNTER (EMERGENCY)
Facility: CLINIC | Age: 63
Discharge: HOME | End: 2023-10-06
Attending: NURSE PRACTITIONER | Admitting: NURSE PRACTITIONER
Payer: COMMERCIAL

## 2023-10-06 ENCOUNTER — APPOINTMENT (OUTPATIENT)
Dept: GENERAL RADIOLOGY | Facility: CLINIC | Age: 63
End: 2023-10-06
Attending: NURSE PRACTITIONER
Payer: COMMERCIAL

## 2023-10-06 VITALS
HEART RATE: 61 BPM | DIASTOLIC BLOOD PRESSURE: 61 MMHG | OXYGEN SATURATION: 96 % | SYSTOLIC BLOOD PRESSURE: 109 MMHG | RESPIRATION RATE: 16 BRPM | TEMPERATURE: 97.5 F

## 2023-10-06 DIAGNOSIS — M54.42 ACUTE LEFT-SIDED LOW BACK PAIN WITH LEFT-SIDED SCIATICA: ICD-10-CM

## 2023-10-06 PROCEDURE — 72100 X-RAY EXAM L-S SPINE 2/3 VWS: CPT

## 2023-10-06 PROCEDURE — G0463 HOSPITAL OUTPT CLINIC VISIT: HCPCS | Performed by: NURSE PRACTITIONER

## 2023-10-06 PROCEDURE — 99213 OFFICE O/P EST LOW 20 MIN: CPT | Performed by: NURSE PRACTITIONER

## 2023-10-06 ASSESSMENT — ACTIVITIES OF DAILY LIVING (ADL): ADLS_ACUITY_SCORE: 35

## 2023-10-06 NOTE — ED PROVIDER NOTES
ED Provider Note  [facility]      History   No chief complaint on file.    HPI  Radames Figueroa is a 63 year old male who presents to urgent care today with symptoms of pain just above left buttock, travels down buttock and wraps around his leg for approximately 1 year.  Denies any known injury, previous back complications or injuries.  Denies any change or loss of sensation in his lower extremities, denies any change or loss of bowel or bladder function.  Denies weakness in his left leg or foot.  Reports that he has tried ibuprofen and Tylenol with little relief.            Allergies:  No Known Allergies    Problem List:    Patient Active Problem List    Diagnosis Date Noted    CARDIOVASCULAR SCREENING; LDL GOAL LESS THAN 130 10/20/2014     Priority: Medium    Umbilical hernia 10/20/2014     Priority: Medium    Seborrheic keratosis 10/20/2014     Priority: Medium        Past Medical History:    Past Medical History:   Diagnosis Date    History of open heart surgery age 9       Past Surgical History:    Past Surgical History:   Procedure Laterality Date    VASECTOMY  11/25/92    ZZC ANESTH,OPEN HEART SURGERY+PUMP  age 9    valve replacement       Family History:    Family History   Problem Relation Age of Onset    C.A.D. Father 73        [place]    Cancer Mother         multiple myeloma    C.A.D. Brother 72        [place]    C.A.D. Brother 70        MI    Cancer - colorectal No family hx of     Prostate Cancer No family hx of        Social History:  Marital Status:   [2]  Social History     Tobacco Use    Smoking status: Never    Smokeless tobacco: Never   Vaping Use    Vaping Use: Never used   Substance Use Topics    Alcohol use: Yes     Comment: 1-2 per week.    Drug use: No        Medications:    amoxicillin (AMOXIL) 500 MG capsule  aspirin (ASA) 81 MG chewable tablet  doxycycline hyclate (VIBRAMYCIN) 100 MG capsule  metoprolol tartrate (LOPRESSOR) 25 MG tablet          Review of  Systems  A medically appropriate review of systems was performed with pertinent positives and negatives noted in the HPI, and all other systems negative.    Physical Exam   Patient Vitals for the past 24 hrs:   BP Temp Temp src Pulse Resp SpO2   10/06/23 1209 109/61 97.5  F (36.4  C) Oral 61 16 96 %          Physical Exam  General: alert and in no acute distress on arrival  Head: atraumatic, normocephalic  Lungs:  nonlabored, CTA bilateral throughout  CV:  extremities warm and perfused, pulses normal and present foot through popliteal left-sided.  Abd: nondistended, normal bowel sounds throughout, no HSM.  Skin: no rashes, no diaphoresis and skin color normal  Neuro: Patient awake, alert, speech is fluent, normal and equal strength in lower extremities.  Psychiatric: affect/mood normal, appropriate historian.      ED Course                 Procedures                    No results found for this or any previous visit (from the past 24 hour(s)).    MEDICATIONS GIVEN IN THE EMERGENCY DEPARTMENT:  Medications - No data to display             Assessments & Plan (with Medical Decision Making)  63 year old male who presents to the Urgent Care for evaluation of left low back pain-left acute sciatica     Patient shown stretching exercises for sciatica in urgent care today  I have reviewed the nursing notes.  I have reviewed the findings, diagnosis, plan and need for follow up with the patient.  Referral for physical therapy ordered.  Patient advised they will contact him to schedule.      NEW PRESCRIPTIONS STARTED AT TODAY'S ER VISIT  Discharge Medication List as of 10/6/2023  1:03 PM          Final diagnoses:   Acute left-sided low back pain with left-sided sciatica       10/6/2023   [facility] EMERGENCY DEPT       Soni Leon APRN CNP  10/06/23 [id number]

## 2023-10-06 NOTE — ED TRIAGE NOTES
Pt presents with left side pain upper thigh and buttocks area.  Tried OTC pain med and no relief.  Pain spreads down leg to ankle.

## 2023-10-15 ENCOUNTER — HOSPITAL ENCOUNTER (EMERGENCY)
Facility: CLINIC | Age: 63
Discharge: HOME | End: 2023-10-15
Attending: EMERGENCY MEDICINE | Admitting: EMERGENCY MEDICINE
Payer: COMMERCIAL

## 2023-10-15 VITALS
SYSTOLIC BLOOD PRESSURE: 124 MMHG | HEIGHT: 68 IN | WEIGHT: 222.9 LBS | RESPIRATION RATE: 15 BRPM | OXYGEN SATURATION: 97 % | TEMPERATURE: 97.6 F | BODY MASS INDEX: 33.78 KG/M2 | DIASTOLIC BLOOD PRESSURE: 96 MMHG | HEART RATE: 68 BPM

## 2023-10-15 DIAGNOSIS — M54.42 ACUTE LEFT-SIDED LOW BACK PAIN WITH LEFT-SIDED SCIATICA: ICD-10-CM

## 2023-10-15 PROCEDURE — 99284 EMERGENCY DEPT VISIT MOD MDM: CPT | Performed by: EMERGENCY MEDICINE

## 2023-10-15 RX ORDER — HYDROCODONE BITARTRATE AND ACETAMINOPHEN 5; 325 MG/1; MG/1
1 TABLET ORAL EVERY 6 HOURS PRN
Qty: 15 TABLET | Refills: 0 | Status: SHIPPED | OUTPATIENT
Start: 2023-10-15 | End: 2023-10-18

## 2023-10-15 RX ORDER — METHYLPREDNISOLONE 4 MG
TABLET, DOSE PACK ORAL
Qty: 21 TABLET | Refills: 0 | Status: SHIPPED | OUTPATIENT
Start: 2023-10-15

## 2023-10-15 NOTE — ED PROVIDER NOTES
History     Chief Complaint   Patient presents with    Back Pain     HPI  Radames Figueroa is a 63 year old male who presents for evaluation of back pain.  Pain has been in the left lower back radiating down his leg.  He works as a , sitting for 14 to 15 hours a day sometimes.  He has had no recent trauma.  No loss of bowel or bladder.  No motor dysfunction.  He does have pain and numbness radiating down his left leg into his foot.  Never history of significant back trouble.  No back surgery.  He has been trying Salonpas patch, NSAIDs and Tylenol without relief.  He was seen in urgent care where they did an x-ray but did not recommend anything further they report.    Allergies:  No Known Allergies    Problem List:    Patient Active Problem List    Diagnosis Date Noted    CARDIOVASCULAR SCREENING; LDL GOAL LESS THAN 130 10/20/2014     Priority: Medium    Umbilical hernia 10/20/2014     Priority: Medium    Seborrheic keratosis 10/20/2014     Priority: Medium        Past Medical History:    Past Medical History:   Diagnosis Date    History of open heart surgery age 9       Past Surgical History:    Past Surgical History:   Procedure Laterality Date    VASECTOMY  11/25/92    ZZC ANESTH,OPEN HEART SURGERY+PUMP  age 9    valve replacement       Family History:    Family History   Problem Relation Age of Onset    C.A.D. Father 73        [place]    Cancer Mother         multiple myeloma    C.A.D. Brother 72        [place]    C.A.D. Brother 70        [place]    Cancer - colorectal No family hx of     Prostate Cancer No family hx of        Social History:  Marital Status:   [2]  Social History     Tobacco Use    Smoking status: Never    Smokeless tobacco: Never   Vaping Use    Vaping Use: Never used   Substance Use Topics    Alcohol use: Yes     Comment: 1-2 per week.    Drug use: No        Medications:    HYDROcodone-acetaminophen (NORCO) 5-325 MG tablet  methylPREDNISolone (MEDROL DOSEPAK) 4 MG tablet therapy  "pack  amoxicillin (AMOXIL) 500 MG capsule  aspirin (ASA) 81 MG chewable tablet  doxycycline hyclate (VIBRAMYCIN) 100 MG capsule  metoprolol tartrate (LOPRESSOR) 25 MG tablet          Review of Systems  All other systems are reviewed and are negative    Physical Exam   BP: (!) 124/96  Pulse: 68  Temp: 97.6  F (36.4  C)  Resp: 15  Height: 172.7 cm (5' 8\")  Weight: 101.1 kg (222 lb 14.4 oz)  SpO2: 97 %      Physical Exam  Vitals reviewed.   Constitutional:       General: He is not in acute distress.     Appearance: He is not diaphoretic.   HENT:      Head: Normocephalic and atraumatic.   Eyes:      General: No scleral icterus.        Right eye: No discharge.         Left eye: No discharge.      Conjunctiva/sclera: Conjunctivae normal.   Pulmonary:      Effort: Pulmonary effort is normal.      Breath sounds: No stridor.   Musculoskeletal:      Cervical back: Normal range of motion.      Comments: Strength intact in lower extremities.  Low back reveals no area of erythema ecchymosis, swelling or deformity   Skin:     General: Skin is warm and dry.      Findings: No rash.   Neurological:      Mental Status: He is alert.      Comments: Normal speech and mentation   Psychiatric:         Judgment: Judgment normal.         ED Course                 Procedures                  No results found for this or any previous visit (from the past 24 hour(s)).    Medications - No data to display    Assessments & Plan (with Medical Decision Making)  63-year-old male  with ongoing back pain and left sciatica.  Symptoms of long duration.  Will place on Medrol Dosepak.  Norco prescribed.  Instructed not to drive while using any opiates.  Outpatient MRI order placed.  Referred to Dr. Driver for follow-up.       I have reviewed the nursing notes.    I have reviewed the findings, diagnosis, plan and need for follow up with the patient.          New Prescriptions    HYDROCODONE-ACETAMINOPHEN (NORCO) 5-325 MG TABLET    Take 1 tablet " by mouth every 6 hours as needed for severe pain    METHYLPREDNISOLONE (MEDROL DOSEPAK) 4 MG TABLET THERAPY PACK    Follow Package Directions       Final diagnoses:   Acute left-sided low back pain with left-sided sciatica       10/15/2023   [facility] EMERGENCY DEPT       Edwin Jackson MD  10/15/23 1344

## 2023-10-15 NOTE — ED TRIAGE NOTES
Left sided sciatic pain for the last couple weeks and now getting so bad he is having a hard time getting out of bed in the morning.  States toes get a little numb on the left foot.   Was at  last week and they did xrays and sent home.

## 2023-10-15 NOTE — DISCHARGE INSTRUCTIONS
Continue with Salonpas patch as needed.  May also continue with Tylenol up to 1000 mg 4 times a day.  May also use ibuprofen up to 6 and milligrams 4 times a day

## 2023-10-15 NOTE — LETTER
October 15, 2023      To Whom It May Concern:      Radames Figueroa was seen in our Emergency Department today, 10/15/23.  I expect his condition to improve over the next 14 days.  He may return to work and drving when improved.    Sincerely,        Edwin Jackson MD

## 2023-10-18 ENCOUNTER — PATIENT OUTREACH (OUTPATIENT)
Dept: FAMILY MEDICINE | Facility: CLINIC | Age: 63
End: 2023-10-18
Payer: COMMERCIAL

## 2023-10-18 NOTE — TELEPHONE ENCOUNTER
Patient Contact     Attempt # 1     Was call answered?  No.  Left message on voicemail with information to call back.         Anne Marie Bernal RN on 10/18/2023 at 5:30 PM

## 2023-10-18 NOTE — TELEPHONE ENCOUNTER
What type of discharge? Emergency Department  Risk of Hospital admission or ED visit: 42.6%  Is a TCM episode required? No  When should the patient follow up with PCP? 7 days of discharge.    Zamzam Sheriff RN on 10/18/2023 at 11:02 AM

## 2024-05-25 ENCOUNTER — HEALTH MAINTENANCE LETTER (OUTPATIENT)
Age: 64
End: 2024-05-25

## 2025-02-22 ENCOUNTER — HEALTH MAINTENANCE LETTER (OUTPATIENT)
Age: 65
End: 2025-02-22